# Patient Record
Sex: FEMALE | URBAN - METROPOLITAN AREA
[De-identification: names, ages, dates, MRNs, and addresses within clinical notes are randomized per-mention and may not be internally consistent; named-entity substitution may affect disease eponyms.]

---

## 2024-06-11 ENCOUNTER — TELEPHONE (OUTPATIENT)
Age: 24
End: 2024-06-11

## 2024-06-11 NOTE — TELEPHONE ENCOUNTER
Spoke to Tara who does [facility] in [place]. Gave her information about pt, details of her breast symptoms. Explained pt lived in [place] and would like closer location if possible, to be seen. Informed Tara pt's [facility] eligibility was completed. Faxed eligibility to Tara. Tara accepted pt and would reach out to her to yared her schedule. Tara has [facility] phone number if any questions. KINJAL FISHER RN